# Patient Record
Sex: FEMALE | Race: WHITE | Employment: FULL TIME | URBAN - METROPOLITAN AREA
[De-identification: names, ages, dates, MRNs, and addresses within clinical notes are randomized per-mention and may not be internally consistent; named-entity substitution may affect disease eponyms.]

---

## 2017-05-01 ENCOUNTER — HOSPITAL ENCOUNTER (OUTPATIENT)
Dept: GENERAL RADIOLOGY | Age: 47
Discharge: HOME | End: 2017-05-01
Attending: INTERNAL MEDICINE
Payer: COMMERCIAL

## 2017-05-01 DIAGNOSIS — J40 BRONCHITIS: ICD-10-CM

## 2017-05-01 PROCEDURE — 71020 XR CHEST PA LAT: CPT

## 2019-10-16 ENCOUNTER — HOSPITAL ENCOUNTER (OUTPATIENT)
Dept: CT IMAGING | Age: 49
Discharge: HOME | End: 2019-10-16
Attending: SPECIALIST
Payer: SELF-PAY

## 2019-10-16 DIAGNOSIS — Z00.00 PREVENTATIVE HEALTH CARE: ICD-10-CM

## 2019-10-16 PROCEDURE — 75571 CT HRT W/O DYE W/CA TEST: CPT

## 2019-10-17 NOTE — CARDIO/PULMONARY
Cardiopulmonary Rehab: I reached this patient by phone and shared her coronary calcium CT score of \"20 \" with her. Education given regarding coronary artery disease and its effects on the cardiovascular system were reviewed. Patient states that she does have a family history of coronary artery disease, that she does not have diabetes, and  states she is not a nicotine user. Patient reports she is not currently taking cholesterol medication after having difficulty with side effects. Patient states she does not currently require blood pressure medication. Patient reports being of normal weight, reports making heart healthy diet choices on most days (and states this is a recent development) and is regularly physically active. Patient does want to follow up with a dietitian. Our dietitian will contact her. Patient does feel that stress is a risk factor for her. We discussed the potential daily stress management benefits of physical activity, terell chi, yoga and deep breathing throughout the day. Patient to follow up with primary care physician, Dr. Richa Martinez, and asks that I fax a copy of this report to his office - which I have done. Patient also requests a copy of this report be mailed to her home address which I will do. Understanding verbalized and no further questions at this time.

## 2020-07-09 ENCOUNTER — TELEPHONE (OUTPATIENT)
Dept: CARDIOLOGY CLINIC | Age: 50
End: 2020-07-09

## 2020-07-09 NOTE — TELEPHONE ENCOUNTER
Patient states she spoke with Dr. Fanny Weinstein at [facility] and was told to call and set up a stress test with his nurse. Please advise.      Phone: [phone]

## 2020-07-09 NOTE — TELEPHONE ENCOUNTER
MD Naima Hernandez, RN    Caller: Unspecified (Today,  1:55 PM)               Please set up the patient for a clinic follow up with me in 1-2 weeks.  Will evaluate in the clinic prior to ordering stress test.     [facility]

## 2020-07-10 NOTE — TELEPHONE ENCOUNTER
Returned call to patient. Two patient indentifiers verified. Pt was scheduled for an appointment. Pt verbalized understanding and denies any further questions at this time.

## 2020-07-14 ENCOUNTER — OFFICE VISIT (OUTPATIENT)
Dept: CARDIOLOGY CLINIC | Age: 50
End: 2020-07-14

## 2020-07-14 VITALS
HEIGHT: 69 IN | OXYGEN SATURATION: 97 % | BODY MASS INDEX: 24.59 KG/M2 | SYSTOLIC BLOOD PRESSURE: 116 MMHG | DIASTOLIC BLOOD PRESSURE: 60 MMHG | HEART RATE: 72 BPM | WEIGHT: 166 LBS

## 2020-07-14 DIAGNOSIS — R07.89 CHEST DISCOMFORT: ICD-10-CM

## 2020-07-14 DIAGNOSIS — E03.9 HYPOTHYROIDISM, UNSPECIFIED TYPE: Primary | ICD-10-CM

## 2020-07-14 DIAGNOSIS — E78.2 MIXED HYPERLIPIDEMIA: ICD-10-CM

## 2020-07-14 RX ORDER — LEVOTHYROXINE SODIUM 100 UG/1
TABLET ORAL
COMMUNITY

## 2020-07-14 NOTE — PROGRESS NOTES
[facility] [address]: Helen Hurtado  [phone]          Cardiology Consult/Progress Note      Requesting/referring provider: Dr. Guero Jean  Reason for Consult: Chest fullness/pain    HPI: Betito Isidro, a 44y.o. year-old who presents for evaluation of chest pain in the precordium and left chest. Noticing mostly at night, not during exertion. Associated with pounding palpitation but no rate related palpitations. She is a perimenopausal female who recently started bioequivalent hormonal supplements recently which exacerbation there symptoms. Recent ECG with PCP was mentioned to be unremarkable. Lifestyle controlled as well as hypothyroidism. She has been noticing more fullness  In chest with feeling of dyspnea over past few months which is non exertional. Her PCP performed lipid profile which showed elevated LDL. She is anxious about above symptoms. She denies any pedal edema. No syncope is reported. Sohan zhang is active an bikes about 5 miles 3 times a week. Investigation  Lipids: Jan 2020: , Total Chol 214mg/dl  ECG: Verbally told to be normal in July 2020  TSH Jan 2020: 6  Normal CMP, Jan 2020  HbA1c: 5.3 Jan 2020      Assessment/Plan:  1)Chest pain of uncertain etiology  2)Hypothyroidism  3)Perimenopausal??  4)Hyperlipidemia    Betito Isidro was seen for chest pain symptoms starting recently. Pain is atypical but she has family history of heart disease as well as premature ASCD with stroke in 45s. She also has hyperlipidemia and thus is potentially at risk for premature CAD. I recommend doing a stress test as well as echo for her symptoms. Also suggested initiation of lipid therapy but she will like to try dietary modification first. Will check lipids in 3 months.  Provisional follow up in 1 year          []    High complexity decision making was performed  []    Patient is at high-risk of decompensation with multiple organ involvement  She  has no past medical history on file. Review of system:Patient reports no dyspnea/PND/Orthpnea. She reports no cough/fever/focal neurological deficits/abdominal pain. All other systems negative except as above. No family history on file. Bindu Crenshaw was a smoker with stroke in 45s and  of an MI in 76s    Social history: Non smoker,     PMH: Hypothyroidism, Chest pain    Social History     Socioeconomic History    Marital status:      Spouse name: Not on file    Number of children: Not on file    Years of education: Not on file    Highest education level: Not on file   Tobacco Use    Smoking status: Never Smoker    Smokeless tobacco: Never Used      PE  Vitals:    20 0930   BP: 116/60   Pulse: 72   SpO2: 97%   Weight: 166 lb (75.3 kg)   Height: 5' 9\" (1.753 m)    Body mass index is 24.51 kg/m². General:    Alert, cooperative, no distress. Psychiatric:    Normal Mood and affect    Eye/ENT:      Pupils equal, No asymmetry, Conjunctival pink. Able to hear voice at normal amplitude   Lungs:      Visibly symmetric chest expansion, No palpable tenderness. Clear to auscultation bilaterally. Heart[de-identified]    Regular rate and rhythm, S1, S2 normal, no murmur, click, rub or gallop. No JVD, Normal palpable peripheral pulses. No cyanosis   Abdomen:     Soft, non-tender. Bowel sounds normal. No masses,  No      organomegaly. Extremities:   Extremities normal, atraumatic, no edema. Neurologic:   CN II-XII grossly intact.  No gross focal deficits           Recent Labs:  No results found for: CHOL, CHOLX, CHLST, CHOLV, 818383, HDL, HDLP, LDL, LDLC, DLDLP, TGLX, TRIGL, TRIGP, CHHD, CHHDX  No results found for: YORDAN, CREAPOC, ACREA, CREA, REFC3, REFC4  No results found for: BUN, BUNPOC, IBUN, MBUNV, BUNV  No results found for: K, KI, PLK, WBK  No results found for: HBA1C, HGBE8, BUH4JDCR  No results found for: HGBPOC, HGB, HGBP, HGBEXT  No results found for: PLT, PLTEXT    Reviewed:  No past medical history on file.  Social History     Tobacco Use   Smoking Status Never Smoker   Smokeless Tobacco Never Used     Social History     Substance and Sexual Activity   Alcohol Use None     No Known Allergies  No family history on file. Current Outpatient Medications   Medication Sig    levothyroxine (SYNTHROID) 100 mcg tablet Take  by mouth Daily (before breakfast). No current facility-administered medications for this visit. Ca Morfin MD07/14/20 There are other unrelated non-urgent complaints, but due to the busy schedule and the amount of time I've already spent with her, time does not permit me to address these routine issues at today's visit. I've requested another appointment to review these additional issues. ATTENTION:   This medical record was transcribed using an electronic medical records/speech recognition system. Although proofread, it may and can contain electronic, spelling and other errors. Corrections may be executed at a later time. Please feel free to contact us for any clarifications as needed.     [facility] [place]  [facility], [address] [place] [address] Avenue

## 2020-07-14 NOTE — PATIENT INSTRUCTIONS
You will be scheduled for a Exercise (Treadmill) Test after your appointment today. Please wear comfortable clothing (shorts or pants with a shirt or blouse) and walking/athletic shoes.     Do not eat or drink anything, except water, for at least 2 hours prior to your test.    Do take your scheduled medications prior to your test.

## 2020-07-14 NOTE — LETTER
7/14/20 Patient: Leana Presley YOB: 1970 Date of Visit: 7/14/2020 Janice Damon MD 
[facility]  [facility] [address] [id number] VIA Facsimile: [phone] Dear Janice Damon MD, Thank you for referring Ms. Leana Presley to [facility] for evaluation. My notes for this consultation are attached. If you have questions, please do not hesitate to call me. I look forward to following your patient along with you.  
 
 
Sincerely, 
 
Ryan Gunn MD

## 2020-07-14 NOTE — PROGRESS NOTES
Kate Drake is a 44 y.o. female    Visit Vitals  /60 (BP 1 Location: Left arm, BP Patient Position: Sitting)   Pulse 72   Ht 5' 9\" (1.753 m)   Wt 166 lb (75.3 kg)   SpO2 97%   BMI 24.51 kg/m²       Chief Complaint   Patient presents with    Chest Pain       Chest pain LEFT SIDE MOSTLY AT NIGHT.    SOB NO  Dizziness NO  Swelling NO  Recent hospital visit NO  Refills NO

## 2020-07-16 ENCOUNTER — OFFICE VISIT (OUTPATIENT)
Dept: URGENT CARE | Age: 50
End: 2020-07-16

## 2020-07-16 VITALS — TEMPERATURE: 98.9 F | OXYGEN SATURATION: 97 % | HEART RATE: 85 BPM | RESPIRATION RATE: 16 BRPM

## 2020-07-16 DIAGNOSIS — Z20.822 ENCOUNTER FOR LABORATORY TESTING FOR COVID-19 VIRUS: Primary | ICD-10-CM

## 2020-07-21 LAB — SARS-COV-2, NAA: NOT DETECTED

## 2020-08-07 ENCOUNTER — DOCUMENTATION ONLY (OUTPATIENT)
Dept: CARDIOLOGY CLINIC | Age: 50
End: 2020-08-07

## 2020-08-07 ENCOUNTER — ANCILLARY PROCEDURE (OUTPATIENT)
Dept: CARDIOLOGY CLINIC | Age: 50
End: 2020-08-07
Payer: COMMERCIAL

## 2020-08-07 VITALS
BODY MASS INDEX: 24.59 KG/M2 | HEIGHT: 69 IN | SYSTOLIC BLOOD PRESSURE: 116 MMHG | WEIGHT: 166 LBS | DIASTOLIC BLOOD PRESSURE: 60 MMHG

## 2020-08-07 DIAGNOSIS — R07.89 CHEST DISCOMFORT: ICD-10-CM

## 2020-08-07 DIAGNOSIS — E78.2 MIXED HYPERLIPIDEMIA: ICD-10-CM

## 2020-08-07 LAB
ECHO AO ASC DIAM: 2.64 CM
ECHO AO ROOT DIAM: 3.19 CM
ECHO AV AREA PEAK VELOCITY: 2.67 CM2
ECHO AV AREA VTI: 2.96 CM2
ECHO AV AREA/BSA PEAK VELOCITY: 1.4 CM2/M2
ECHO AV AREA/BSA VTI: 1.6 CM2/M2
ECHO AV MEAN GRADIENT: 2.36 MMHG
ECHO AV PEAK GRADIENT: 4.25 MMHG
ECHO AV PEAK VELOCITY: 103.1 CM/S
ECHO AV VTI: 20.34 CM
ECHO IVC PROX: 1.33 CM
ECHO LA AREA 4C: 15.16 CM2
ECHO LA MAJOR AXIS: 2.89 CM
ECHO LA MINOR AXIS: 1.51 CM
ECHO LA VOL 2C: 37.49 ML (ref 22–52)
ECHO LA VOL 4C: 34.07 ML (ref 22–52)
ECHO LA VOL BP: 41.58 ML (ref 22–52)
ECHO LA VOL/BSA BIPLANE: 21.79 ML/M2 (ref 16–28)
ECHO LA VOLUME INDEX A2C: 19.64 ML/M2 (ref 16–28)
ECHO LA VOLUME INDEX A4C: 17.85 ML/M2 (ref 16–28)
ECHO LV E' LATERAL VELOCITY: 11.24 CM/S
ECHO LV E' SEPTAL VELOCITY: 10.55 CM/S
ECHO LV EDV A2C: 53.99 ML
ECHO LV EDV A4C: 77.29 ML
ECHO LV EDV BP: 66.39 ML (ref 56–104)
ECHO LV EDV INDEX A4C: 40.5 ML/M2
ECHO LV EDV INDEX BP: 34.8 ML/M2
ECHO LV EDV NDEX A2C: 28.3 ML/M2
ECHO LV EJECTION FRACTION A2C: 66 PERCENT
ECHO LV EJECTION FRACTION A4C: 64 PERCENT
ECHO LV EJECTION FRACTION BIPLANE: 65.2 PERCENT (ref 55–100)
ECHO LV ESV A2C: 18.61 ML
ECHO LV ESV A4C: 27.82 ML
ECHO LV ESV BP: 23.12 ML (ref 19–49)
ECHO LV ESV INDEX A2C: 9.8 ML/M2
ECHO LV ESV INDEX A4C: 14.6 ML/M2
ECHO LV ESV INDEX BP: 12.1 ML/M2
ECHO LV INTERNAL DIMENSION DIASTOLIC: 4.11 CM (ref 3.9–5.3)
ECHO LV INTERNAL DIMENSION SYSTOLIC: 2.55 CM
ECHO LV IVSD: 0.91 CM (ref 0.6–0.9)
ECHO LV MASS 2D: 125.3 G (ref 67–162)
ECHO LV MASS INDEX 2D: 65.6 G/M2 (ref 43–95)
ECHO LV POSTERIOR WALL DIASTOLIC: 1.01 CM (ref 0.6–0.9)
ECHO LVOT DIAM: 1.98 CM
ECHO LVOT PEAK GRADIENT: 3.19 MMHG
ECHO LVOT PEAK VELOCITY: 89.25 CM/S
ECHO LVOT SV: 60.2 ML
ECHO LVOT VTI: 19.54 CM
ECHO MV A VELOCITY: 46.79 CM/S
ECHO MV AREA PHT: 4.48 CM2
ECHO MV E DECELERATION TIME (DT): 0.17 S
ECHO MV E VELOCITY: 63.85 CM/S
ECHO MV E/A RATIO: 1.36
ECHO MV E/E' LATERAL: 5.68
ECHO MV E/E' RATIO (AVERAGED): 5.87
ECHO MV E/E' SEPTAL: 6.05
ECHO MV PRESSURE HALF TIME (PHT): 0.05 S
ECHO RA MAJOR AXIS: 2.93 CM
ECHO RV INTERNAL DIMENSION: 2.79 CM
ECHO RV TAPSE: 1.81 CM (ref 1.5–2)
LA VOL DISK BP: 37.63 ML (ref 22–52)
LVOT MG: 1.82 MMHG
STRESS ANGINA INDEX: 0
STRESS BASELINE DIAS BP: 60 MMHG
STRESS BASELINE HR: 71 BPM
STRESS BASELINE SYS BP: 100 MMHG
STRESS ESTIMATED WORKLOAD: 10.1 METS
STRESS EXERCISE DUR MIN: NORMAL MIN:SEC
STRESS O2 SAT PEAK: 98 %
STRESS PEAK DIAS BP: 70 MMHG
STRESS PEAK SYS BP: 120 MMHG
STRESS PERCENT HR ACHIEVED: 91 %
STRESS POST PEAK HR: 153 BPM
STRESS RATE PRESSURE PRODUCT: NORMAL BPM*MMHG
STRESS SR DUKE TREADMILL SCORE: -2
STRESS ST DEPRESSION: 2 MM
STRESS TARGET HR: 168 BPM

## 2020-08-07 PROCEDURE — 93015 CV STRESS TEST SUPVJ I&R: CPT | Performed by: SPECIALIST

## 2020-08-07 PROCEDURE — 93306 TTE W/DOPPLER COMPLETE: CPT | Performed by: SPECIALIST

## 2020-08-07 NOTE — PROGRESS NOTES
Stress test had ekg changes which make it positive. Likely false positive, however she should have stress echo to make sure nothing is going on.

## 2020-08-10 ENCOUNTER — TELEPHONE (OUTPATIENT)
Dept: CARDIOLOGY CLINIC | Age: 50
End: 2020-08-10

## 2020-08-10 NOTE — TELEPHONE ENCOUNTER
Antonio Shoemaker MD 3 days ago          Stress test had ekg changes which make it positive. Likely false positive, however she should have stress echo to make sure nothing is going on.

## 2020-08-13 ENCOUNTER — TELEPHONE (OUTPATIENT)
Dept: CARDIOLOGY CLINIC | Age: 50
End: 2020-08-13

## 2020-08-13 DIAGNOSIS — R94.31 ABNORMAL EKG: Primary | ICD-10-CM

## 2020-08-13 NOTE — TELEPHONE ENCOUNTER
----- Message from Rebekah Bryson MD sent at 8/12/2020 11:25 AM EDT -----  Call patient to inform that the echo is normal but stress test is not completely normal. Would be good to set up a follow up appointment with possibly echo stress test/nuclear stress test as there is a chance that the ecg stress is false positive. Alternatively I can see her first to discuss the optios.   ----- Message -----  From: Jose Luis Wolfe MD  Sent: 8/7/2020   5:53 PM EDT  To: Rebekah Bryson MD

## 2020-08-13 NOTE — TELEPHONE ENCOUNTER
Called patient. Two patient indentifiers verified. Pt was scheduled for a nuclear stress test and an appointment with Chintan Hurtado to discuss the stress test results. Pt verbalized understanding and denies any further questions at this time.      Future Appointments   Date Time Provider Flo Gallo   8/21/2020  1:40 PM MD YAMILET Villasenor BS AMB   8/31/2020  1:00 PM JOSÉ HOLT AMB   7/13/2021  3:00 PM MD YAMILET Villasenor BS AMB

## 2020-08-14 DIAGNOSIS — R94.31 ABNORMAL EKG: ICD-10-CM

## 2020-08-21 ENCOUNTER — OFFICE VISIT (OUTPATIENT)
Dept: CARDIOLOGY CLINIC | Age: 50
End: 2020-08-21
Payer: COMMERCIAL

## 2020-08-21 VITALS
BODY MASS INDEX: 24.44 KG/M2 | HEART RATE: 79 BPM | HEIGHT: 69 IN | DIASTOLIC BLOOD PRESSURE: 60 MMHG | RESPIRATION RATE: 18 BRPM | WEIGHT: 165 LBS | SYSTOLIC BLOOD PRESSURE: 120 MMHG | OXYGEN SATURATION: 98 %

## 2020-08-21 DIAGNOSIS — R94.31 ABNORMAL ECG DURING EXERCISE STRESS TEST: ICD-10-CM

## 2020-08-21 DIAGNOSIS — R07.9 CHEST PAIN, UNSPECIFIED TYPE: Primary | ICD-10-CM

## 2020-08-21 DIAGNOSIS — E03.9 ACQUIRED HYPOTHYROIDISM: ICD-10-CM

## 2020-08-21 DIAGNOSIS — F41.9 ANXIETY: ICD-10-CM

## 2020-08-21 DIAGNOSIS — N95.1 FLUSHING, MENOPAUSAL: ICD-10-CM

## 2020-08-21 PROCEDURE — 99213 OFFICE O/P EST LOW 20 MIN: CPT | Performed by: INTERNAL MEDICINE

## 2020-08-22 PROBLEM — E03.9 ACQUIRED HYPOTHYROIDISM: Status: ACTIVE | Noted: 2020-08-22

## 2020-08-22 PROBLEM — R94.31 ABNORMAL ECG DURING EXERCISE STRESS TEST: Status: ACTIVE | Noted: 2020-08-22

## 2020-08-22 PROBLEM — F41.9 ANXIETY: Status: ACTIVE | Noted: 2020-08-22

## 2020-08-22 PROBLEM — R07.9 CHEST PAIN: Status: ACTIVE | Noted: 2020-08-22

## 2020-08-22 PROBLEM — N95.1 FLUSHING, MENOPAUSAL: Status: ACTIVE | Noted: 2020-08-22

## 2020-08-22 NOTE — PROGRESS NOTES
[facility] [address]: Neena Carter  [phone]          Cardiology Consult/Progress Note      Requesting/referring provider: Dr. Lane Mcgowan  Reason for Consult: Chest fullness/pain    HPI: Jo Gomez, a 44y.o. year-old who presents for evaluation of chest pain in the precordium and left chest. Noticing mostly at night, not during exertion. Associated with pounding palpitation but no rate related palpitations. She is a perimenopausal female who recently started progesterone only hormonal supplements recently which exacerbation there symptoms. Recent ECG with PCP was mentioned to be unremarkable. She also has history of hypothyroidism. She has been noticing more fullness  in chest with feeling of dyspnea over past few months which is non exertional. Her PCP performed lipid profile which showed elevated LDL. She is anxious about above symptoms. She denies any pedal edema. No syncope is reported. She otherwise is active an bikes about 5 miles 3 times a week. Recently she underwent echocardiogram at my recommendations which showed normal LV function with no other structural abnormality. She also underwent an ECG stress test which however was not completely normal.    Investigation  Lipids: Jan 2020: , Total Chol 214mg/dl  ECG: Verbally told to be normal in July 2020  TSH Jan 2020: 6  Normal CMP, Jan 2020  HbA1c: 5.3 Jan 2020    Investigations personally reviewed by me  Echocardiogram August 2020: Normal LV function with strain, mild aortic sclerosis otherwise normal echocardiogram  ECG stress test August 2020: Baseline ECG normal.  Patient went to stage III on a Juan C protocol. At stage III she had about 1.5 to 2 mm of ST depressions in inferior leads. She had no associated chest discomfort or shortness of breath. However she was wearing a mask during exercise and feels that she could have gone further if she was not wearing a mask. Geovanni Chen   Hence it was not truly symptom limited stress test.      Assessment/Plan:  1)Chest pain of uncertain etiology  2)Hypothyroidism  3)Perimenopausal??  4)Hyperlipidemia    John Albert was seen for chest pain symptoms starting recently. Pain is atypical but she has family history of heart disease as well as premature ASCD with stroke in 45s. She also has hyperlipidemia and thus is potentially at risk for premature CAD. Her recent echocardiogram was normal.  However her recent stress test ECG changes were seen in the form of intermediate ST depressions. In the setting of such depressions underlying coronary disease cannot be ruled out. However specificity of isolated ST depressions of less than 2 mm in inferior leads in the movement is poor. Hence I offered performing either a imaging with stress test or alternatively a cardiac catheterization to the patient for further confirmation/ruling out underlying coronary disease. She feels more comfortable proceeding with an imaging stress test which is reasonable. Given that her clinical symptoms are atypical and during exercise she did not have such symptoms, I am uncertain regarding what to make out of those EKG changes. Nevertheless we should consider initiation of lipid therapy but she will like to try dietary modification first. Will check lipids in 3 months and follow her up after the imaging stress test.    []    High complexity decision making was performed  []    Patient is at high-risk of decompensation with multiple organ involvement  She  has no past medical history on file. Review of system:Patient reports no dyspnea/PND/Orthpnea. She reports no cough/fever/focal neurological deficits/abdominal pain. All other systems negative except as above. No family history on file.  Melchor Garrison was a smoker with stroke in 45s and  of an MI in 76s    Social history: Non smoker,     PMH: Hypothyroidism, Chest pain    Social History     Socioeconomic History    Marital status:  Spouse name: Not on file    Number of children: Not on file    Years of education: Not on file    Highest education level: Not on file   Tobacco Use    Smoking status: Never Smoker    Smokeless tobacco: Never Used      PE  Vitals:    08/21/20 1400   BP: 120/60   Pulse: 79   Resp: 18   SpO2: 98%   Weight: 165 lb (74.8 kg)   Height: 5' 9\" (1.753 m)    Body mass index is 24.37 kg/m². General:    Alert, cooperative, no distress. Psychiatric:    Normal Mood and affect    Eye/ENT:      Pupils equal, No asymmetry, Conjunctival pink. Able to hear voice at normal amplitude   Lungs:      Visibly symmetric chest expansion, No palpable tenderness. Clear to auscultation bilaterally. Heart[de-identified]    Regular rate and rhythm, S1, S2 normal, no murmur, click, rub or gallop. No JVD, Normal palpable peripheral pulses. No cyanosis   Abdomen:     Soft, non-tender. Bowel sounds normal. No masses,  No      organomegaly. Extremities:   Extremities normal, atraumatic, no edema. Neurologic:   CN II-XII grossly intact. No gross focal deficits           Recent Labs:  No results found for: CHOL, CHOLX, CHLST, CHOLV, 807424, HDL, HDLP, LDL, LDLC, DLDLP, TGLX, TRIGL, TRIGP, CHHD, CHHDX  No results found for: YORDAN, CREAPOC, ACREA, CREA, REFC3, REFC4  No results found for: BUN, BUNPOC, IBUN, MBUNV, BUNV  No results found for: K, KI, PLK, WBK  No results found for: HBA1C, HGBE8, POT9JNEA, HBK0WMNQ  No results found for: HGBPOC, HGB, HGBP, HGBEXT, HGBEXT  No results found for: PLT, PLTEXT, PLTEXT    Reviewed:  No past medical history on file. Social History     Tobacco Use   Smoking Status Never Smoker   Smokeless Tobacco Never Used     Social History     Substance and Sexual Activity   Alcohol Use None     No Known Allergies  No family history on file. Current Outpatient Medications   Medication Sig    levothyroxine (SYNTHROID) 100 mcg tablet Take  by mouth Daily (before breakfast).      No current facility-administered medications for this visit. Ariana Horton MD08/22/20 There are other unrelated non-urgent complaints, but due to the busy schedule and the amount of time I've already spent with her, time does not permit me to address these routine issues at today's visit. I've requested another appointment to review these additional issues. ATTENTION:   This medical record was transcribed using an electronic medical records/speech recognition system. Although proofread, it may and can contain electronic, spelling and other errors. Corrections may be executed at a later time. Please feel free to contact us for any clarifications as needed.     [facility] [place]  [address], [address], [address] [place] [address] Avenue

## 2020-08-28 ENCOUNTER — HOSPITAL ENCOUNTER (OUTPATIENT)
Dept: PREADMISSION TESTING | Age: 50
Discharge: HOME | End: 2020-08-28
Payer: COMMERCIAL

## 2020-08-28 DIAGNOSIS — Z01.812 PRE-PROCEDURE LAB EXAM: ICD-10-CM

## 2020-08-28 PROCEDURE — 87635 SARS-COV-2 COVID-19 AMP PRB: CPT

## 2020-08-29 LAB — SARS-COV-2, COV2NT: NOT DETECTED

## 2020-08-31 ENCOUNTER — ANCILLARY PROCEDURE (OUTPATIENT)
Dept: CARDIOLOGY CLINIC | Age: 50
End: 2020-08-31
Payer: COMMERCIAL

## 2020-08-31 VITALS — HEIGHT: 69 IN | BODY MASS INDEX: 24.59 KG/M2 | WEIGHT: 166 LBS

## 2020-08-31 DIAGNOSIS — R07.9 CHEST PAIN, UNSPECIFIED TYPE: ICD-10-CM

## 2020-08-31 DIAGNOSIS — R94.31 ABNORMAL EKG: ICD-10-CM

## 2020-08-31 DIAGNOSIS — R94.31 ABNORMAL ECG DURING EXERCISE STRESS TEST: ICD-10-CM

## 2020-08-31 PROCEDURE — A9500 TC99M SESTAMIBI: HCPCS

## 2020-08-31 PROCEDURE — 93015 CV STRESS TEST SUPVJ I&R: CPT | Performed by: INTERNAL MEDICINE

## 2020-08-31 PROCEDURE — 78452 HT MUSCLE IMAGE SPECT MULT: CPT | Performed by: INTERNAL MEDICINE

## 2020-08-31 RX ORDER — TETRAKIS(2-METHOXYISOBUTYLISOCYANIDE)COPPER(I) TETRAFLUOROBORATE 1 MG/ML
10 INJECTION, POWDER, LYOPHILIZED, FOR SOLUTION INTRAVENOUS ONCE
Status: COMPLETED | OUTPATIENT
Start: 2020-08-31 | End: 2020-08-31

## 2020-08-31 RX ORDER — TETRAKIS(2-METHOXYISOBUTYLISOCYANIDE)COPPER(I) TETRAFLUOROBORATE 1 MG/ML
30 INJECTION, POWDER, LYOPHILIZED, FOR SOLUTION INTRAVENOUS ONCE
Status: COMPLETED | OUTPATIENT
Start: 2020-08-31 | End: 2020-08-31

## 2020-08-31 RX ADMIN — TETRAKIS(2-METHOXYISOBUTYLISOCYANIDE)COPPER(I) TETRAFLUOROBORATE 25.1 MILLICURIE: 1 INJECTION, POWDER, LYOPHILIZED, FOR SOLUTION INTRAVENOUS at 14:35

## 2020-08-31 RX ADMIN — TETRAKIS(2-METHOXYISOBUTYLISOCYANIDE)COPPER(I) TETRAFLUOROBORATE 8.3 MILLICURIE: 1 INJECTION, POWDER, LYOPHILIZED, FOR SOLUTION INTRAVENOUS at 13:15

## 2020-09-01 LAB
STRESS ANGINA INDEX: 0
STRESS BASELINE DIAS BP: 82 MMHG
STRESS BASELINE HR: 81 BPM
STRESS BASELINE SYS BP: 122 MMHG
STRESS ESTIMATED WORKLOAD: 11.7 METS
STRESS EXERCISE DUR MIN: NORMAL MIN:SEC
STRESS O2 SAT PEAK: 99 %
STRESS O2 SAT REST: 100 %
STRESS PEAK DIAS BP: 76 MMHG
STRESS PEAK SYS BP: 150 MMHG
STRESS PERCENT HR ACHIEVED: 102 %
STRESS POST PEAK HR: 171 BPM
STRESS RATE PRESSURE PRODUCT: NORMAL BPM*MMHG
STRESS SR DUKE TREADMILL SCORE: -1
STRESS ST DEPRESSION: 2 MM
STRESS TARGET HR: 168 BPM

## 2021-08-18 ENCOUNTER — OFFICE VISIT (OUTPATIENT)
Dept: CARDIOLOGY CLINIC | Age: 51
End: 2021-08-18
Payer: COMMERCIAL

## 2021-08-18 VITALS
HEART RATE: 74 BPM | SYSTOLIC BLOOD PRESSURE: 122 MMHG | WEIGHT: 172 LBS | OXYGEN SATURATION: 99 % | RESPIRATION RATE: 16 BRPM | DIASTOLIC BLOOD PRESSURE: 80 MMHG | BODY MASS INDEX: 25.48 KG/M2 | HEIGHT: 69 IN

## 2021-08-18 DIAGNOSIS — R07.9 CHEST PAIN, UNSPECIFIED TYPE: Primary | ICD-10-CM

## 2021-08-18 PROCEDURE — 99214 OFFICE O/P EST MOD 30 MIN: CPT | Performed by: INTERNAL MEDICINE

## 2021-08-18 PROCEDURE — 93000 ELECTROCARDIOGRAM COMPLETE: CPT | Performed by: INTERNAL MEDICINE

## 2021-08-18 NOTE — PROGRESS NOTES
[facility] [address]: Yoli Brito  [phone]          Cardiology Consult/Progress Note      Requesting/referring provider: Dr. Montrell Fonseca  Reason for Consult: Chest fullness/pain    HPI: Tim Kemp, a 46y.o. year-old who presents for evaluation of chest pain in the precordium and left chest. Noticing mostly at night, not during exertion. Associated with pounding palpitation but no rate related palpitations. She is a perimenopausal female who recently started progesterone only hormonal supplements recently which exacerbation there symptoms. Recent ECG with PCP was mentioned to be unremarkable. She also has history of hypothyroidism. She has been noticing more fullness  in chest with feeling of dyspnea over past few months which is non exertional. Her PCP performed lipid profile which showed elevated LDL. She is anxious about above symptoms. She denies any pedal edema. No syncope is reported. She otherwise is active an bikes about 5 miles 3 times a week. Recently she underwent echocardiogram at my recommendations which showed normal LV function with no other structural abnormality. She also underwent an ECG stress test which however was not completely normal.    Investigation  Lipids: Jan 2020: , Total Chol 214mg/dl  ECG: Verbally told to be normal in July 2020  TSH Jan 2020: 6  Normal CMP, Jan 2020  HbA1c: 5.3 Jan 2020    Investigations personally reviewed by me  Echocardiogram August 2020: Normal LV function with strain, mild aortic sclerosis otherwise normal echocardiogram  ECG stress test August 2020: Baseline ECG normal.  Patient went to stage III on a Juan C protocol. At stage III she had about 1.5 to 2 mm of ST depressions in inferior leads. She had no associated chest discomfort or shortness of breath. However she was wearing a mask during exercise and feels that she could have gone further if she was not wearing a mask. Kristopher Stanton   Hence it was not truly symptom limited stress test.  Nuclear SPECT: possible apical perfusion defect possible artifact. Normal EF. NO other perfusion defect. Assessment/Plan:  1)Chest pain of uncertain etiology  2)Hypothyroidism  3)Perimenopausal  4)Hyperlipidemia  5)Headaches:     Dorian Jama was seen for chest pain symptoms starting recently. Pain is atypical but she has family history of heart disease as well as premature ASCD with stroke in 45s. She also has hyperlipidemia and thus is potentially at risk for premature CAD. Her echocardiogram was normal.  However her stress test ECG changes were seen in the form of intermediate ST depressions. In the setting of such depressions underlying coronary disease cannot be ruled out. Hence she underwent a imaging stress test which shows possible artifact in apical segment with normal wall function. NO other perfusion defects were noted. Over past 1 year no further chest pain is noted. She is now engaged in biking and swimming with no exertional angina. ECG changes have resolves. Will recommend Lipid check and start Statin if LDL is > 100 mg/dl. She is unwilling to start empiric statins at this time    []    High complexity decision making was performed  []    Patient is at high-risk of decompensation with multiple organ involvement  She  has no past medical history on file. Review of system:Patient reports no dyspnea/PND/Orthpnea. She reports no cough/fever/focal neurological deficits/abdominal pain. All other systems negative except as above. No family history on file.  Father was a smoker with stroke in 45s and  of an MI in 76s    Social history: Non smoker,     PMH: Hypothyroidism, Chest pain      Social History     Socioeconomic History    Marital status:      Spouse name: Not on file    Number of children: Not on file    Years of education: Not on file    Highest education level: Not on file   Tobacco Use    Smoking status: Never Smoker    Smokeless tobacco: Never Used   Substance and Sexual Activity    Alcohol use: Yes     Alcohol/week: 4.0 standard drinks     Types: 4 Standard drinks or equivalent per week     Comment: 4 a week    Drug use: Never     Social Determinants of Health     Financial Resource Strain:     Difficulty of Paying Living Expenses:    Food Insecurity:     Worried About Running Out of Food in the Last Year:     [address] in the Last Year:    Transportation Needs:     Lack of Transportation (Medical):  Lack of Transportation (Non-Medical):    Physical Activity:     Days of Exercise per Week:     Minutes of Exercise per Session:    Stress:     Feeling of Stress :    Social Connections:     Frequency of Communication with Friends and Family:     Frequency of Social Gatherings with Friends and Family:     Attends [employer] Services:     Active Member of Clubs or Organizations:     Attends Club or Organization Meetings:     Marital Status:       PE  Vitals:    08/18/21 1437   BP: 122/80   Pulse: 74   Resp: 16   SpO2: 99%   Weight: 172 lb (78 kg)   Height: 5' 9\" (1.753 m)    Body mass index is 25.4 kg/m². General:    Alert, cooperative, no distress. Psychiatric:    Normal Mood and affect    Eye/ENT:      Pupils equal, No asymmetry, Conjunctival pink. Able to hear voice at normal amplitude   Lungs:      Visibly symmetric chest expansion, No palpable tenderness. Clear to auscultation bilaterally. Heart[de-identified]    Regular rate and rhythm, S1, S2 normal, no murmur, click, rub or gallop. No JVD, Normal palpable peripheral pulses. No cyanosis   Abdomen:     Soft, non-tender. Bowel sounds normal. No masses,  No      organomegaly. Extremities:   Extremities normal, atraumatic, no edema. Neurologic:   CN II-XII grossly intact.  No gross focal deficits           Recent Labs:  No results found for: CHOL, CHOLX, CHLST, CHOLV, 200035, HDL, HDLP, LDL, LDLC, DLDLP, TGLX, TRIGL, TRIGP, CHHD, CHHDX  No results found for: YORDAN, Wilbert Erm, CREA, REFC3, REFC4  No results found for: BUN, BUNPOC, IBUN, MBUNV, BUNV  No results found for: K, KI, PLK, WBK  No results found for: HBA1C, EKP3QYRP, QZP4AOYC  No results found for: HGBPOC, HGB, HGBP, HGBEXT, HGBEXT  No results found for: PLT, PLTEXT, PLTEXT    Reviewed:  No past medical history on file. Social History     Tobacco Use   Smoking Status Never Smoker   Smokeless Tobacco Never Used     Social History     Substance and Sexual Activity   Alcohol Use Yes    Alcohol/week: 4.0 standard drinks    Types: 4 Standard drinks or equivalent per week    Comment: 4 a week     No Known Allergies  No family history on file. Current Outpatient Medications   Medication Sig    levothyroxine (SYNTHROID) 100 mcg tablet Take  by mouth Daily (before breakfast). No current facility-administered medications for this visit. Laila Martinez MD08/18/21     ATTENTION:   This medical record was transcribed using an electronic medical records/speech recognition system. Although proofread, it may and can contain electronic, spelling and other errors. Corrections may be executed at a later time. Please feel free to contact us for any clarifications as needed.     [facility] [place]  [address], [address], [address] [place] [address]

## 2021-08-18 NOTE — LETTER
8/18/2021    Patient: Pepito Stevens   YOB: 1970   Date of Visit: 8/18/2021     Dusty Zelaya MD  [place] [address]  Via Fax: [phone]    Dear Dusty Zelaya MD,      Thank you for referring Ms. Pepito Stevens to [facility] for evaluation. My notes for this consultation are attached. If you have questions, please do not hesitate to call me. I look forward to following your patient along with you.       Sincerely,    Jose C Gonzales MD

## 2022-03-18 PROBLEM — F41.9 ANXIETY: Status: ACTIVE | Noted: 2020-08-22

## 2022-03-19 PROBLEM — E03.9 ACQUIRED HYPOTHYROIDISM: Status: ACTIVE | Noted: 2020-08-22

## 2022-03-19 PROBLEM — R94.31 ABNORMAL ECG DURING EXERCISE STRESS TEST: Status: ACTIVE | Noted: 2020-08-22

## 2022-03-19 PROBLEM — N95.1 FLUSHING, MENOPAUSAL: Status: ACTIVE | Noted: 2020-08-22

## 2022-03-20 PROBLEM — R07.9 CHEST PAIN: Status: ACTIVE | Noted: 2020-08-22

## 2022-05-04 ENCOUNTER — OFFICE VISIT (OUTPATIENT)
Dept: ORTHOPEDIC SURGERY | Age: 52
End: 2022-05-04
Payer: COMMERCIAL

## 2022-05-04 VITALS — BODY MASS INDEX: 25.18 KG/M2 | HEIGHT: 69 IN | WEIGHT: 170 LBS

## 2022-05-04 DIAGNOSIS — M79.671 RIGHT FOOT PAIN: ICD-10-CM

## 2022-05-04 DIAGNOSIS — M21.41: ICD-10-CM

## 2022-05-04 DIAGNOSIS — M20.11 HALLUX VALGUS (ACQUIRED), RIGHT FOOT: Primary | ICD-10-CM

## 2022-05-04 PROCEDURE — 99203 OFFICE O/P NEW LOW 30 MIN: CPT | Performed by: ORTHOPAEDIC SURGERY

## 2022-05-04 NOTE — PATIENT INSTRUCTIONS
Ankle: Exercises  Introduction  Here are some examples of exercises for you to try. The exercises may be suggested for a condition or for rehabilitation. Start each exercise slowly. Ease off the exercises if you start to have pain. You will be told when to start these exercises and which ones will work best for you. How to do the exercises  'Alphabet' exercise    1. Trace the alphabet with your toe. This helps your ankle move in all directions. Side-to-side knee swing exercise    1. Sit in a chair with your foot flat on the floor. 2. Slowly move your knee from side to side while keeping your foot pressed flat. 3. Continue this exercise for 2 to 3 minutes. Towel curl    1. While sitting, place your foot on a towel on the floor and scrunch the towel toward you with your toes. 2. Then use your toes to push the towel away from you. 3. Make this exercise more challenging by placing a weighted object, such as a soup can, on the other end of the towel. Towel stretch    1. Sit with your legs extended and knees straight. 2. Place a towel around your foot just under the toes. 3. Hold each end of the towel in each hand, with your hands above your knees. 4. Pull back with the towel so that your foot stretches toward you. 5. Hold the position for at least 15 to 30 seconds. 6. Repeat 2 to 4 times a session, up to 5 sessions a day. Ankle eversion exercise    1. Start by sitting with your foot flat on the floor and pushing it outward against an immovable object such as the wall or heavy furniture. Hold for about 6 seconds, then relax. Repeat 8 to 12 times. 2. After you feel comfortable with this, try using rubber tubing looped around the outside of your feet for resistance. Push your foot out to the side against the tubing, and then count to 10 as you slowly bring your foot back to the middle. Repeat 8 to 12 times. Isometric opposition exercises    1.  While sitting, put your feet together flat on the floor.  2. Press your injured foot inward against your other foot. Hold for about 6 seconds, and relax. Repeat 8 to 12 times. 3. Then place the heel of your other foot on top of the injured one. Push down with the top heel while trying to push up with your injured foot. Hold for about 6 seconds, and relax. Repeat 8 to 12 times. Follow-up care is a key part of your treatment and safety. Be sure to make and go to all appointments, and call your doctor if you are having problems. It's also a good idea to know your test results and keep a list of the medicines you take. Where can you learn more? Go to http://www.gray.com/  Enter R730 in the search box to learn more about \"Ankle: Exercises. \"  Current as of: July 1, 2021               Content Version: 13.2  © 0726-2004 [employer]. Care instructions adapted under license by [employer] (which disclaims liability or warranty for this information). If you have questions about a medical condition or this instruction, always ask your healthcare professional. [place] [address] any warranty or liability for your use of this information. Ankle: Exercises  Introduction  Here are some examples of exercises for you to try. The exercises may be suggested for a condition or for rehabilitation. Start each exercise slowly. Ease off the exercises if you start to have pain. You will be told when to start these exercises and which ones will work best for you. How to do the exercises  'Alphabet' exercise    2. Trace the alphabet with your toe. This helps your ankle move in all directions. Side-to-side knee swing exercise    4. Sit in a chair with your foot flat on the floor. 5. Slowly move your knee from side to side while keeping your foot pressed flat. 6. Continue this exercise for 2 to 3 minutes. Towel curl    4.  While sitting, place your foot on a towel on the floor and scrunch the towel toward you with your toes. 5. Then use your toes to push the towel away from you. 6. Make this exercise more challenging by placing a weighted object, such as a soup can, on the other end of the towel. Towel stretch    7. Sit with your legs extended and knees straight. 8. Place a towel around your foot just under the toes. 9. Hold each end of the towel in each hand, with your hands above your knees. 10. Pull back with the towel so that your foot stretches toward you. 11. Hold the position for at least 15 to 30 seconds. 12. Repeat 2 to 4 times a session, up to 5 sessions a day. Ankle eversion exercise    3. Start by sitting with your foot flat on the floor and pushing it outward against an immovable object such as the wall or heavy furniture. Hold for about 6 seconds, then relax. Repeat 8 to 12 times. 4. After you feel comfortable with this, try using rubber tubing looped around the outside of your feet for resistance. Push your foot out to the side against the tubing, and then count to 10 as you slowly bring your foot back to the middle. Repeat 8 to 12 times. Isometric opposition exercises    4. While sitting, put your feet together flat on the floor. 5. Press your injured foot inward against your other foot. Hold for about 6 seconds, and relax. Repeat 8 to 12 times. 6. Then place the heel of your other foot on top of the injured one. Push down with the top heel while trying to push up with your injured foot. Hold for about 6 seconds, and relax. Repeat 8 to 12 times. Follow-up care is a key part of your treatment and safety. Be sure to make and go to all appointments, and call your doctor if you are having problems. It's also a good idea to know your test results and keep a list of the medicines you take. Where can you learn more? Go to http://www.gray.com/  Enter R730 in the search box to learn more about \"Ankle: Exercises. \"  Current as of: July 1, 2021               Content Version: 13.2  © 2006-2022 [employer]. Care instructions adapted under license by [employer] (which disclaims liability or warranty for this information). If you have questions about a medical condition or this instruction, always ask your healthcare professional. [address] any warranty or liability for your use of this information. Foot Arthritis: Exercises  Introduction  Here are some examples of exercises for you to try. The exercises may be suggested for a condition or for rehabilitation. Start each exercise slowly. Ease off the exercises if you start to have pain. You will be told when to start these exercises and which ones will work best for you. How to do the exercises  Calf stretch (knees straight)    3. Place a book on the floor a few inches from a wall or countertop, and put the balls of your feet on it. Your heels should be on the floor. The book needs to be thick enough so that you can feel a gentle stretch in your calf. If you are not steady on your feet, hold on to a chair, counter, or wall while you do this stretch. 4. Keep your knees straight, and lean forward until you feel a stretch in your calf. 5. To get more stretch, add another book or use a thicker book, such as a phone book, a dictionary, or an encyclopedia. 6. Hold the stretch for at least 15 to 30 seconds. 7. Repeat 2 to 4 times. Calf stretch (knees bent)    7. Place a book on the floor a few inches from a wall or countertop, and put the balls of your feet on it. Your heels should be on the floor. The book needs to be thick enough so that you can feel a gentle stretch in your calf. If you are not steady on your feet, hold on to a chair, counter, or wall while you do this stretch. 8. Bend your knees, and lean forward until you feel a stretch in your calf.   9. To get more stretch, add another book or use a thicker book, such as a phone book, a dictionary, or an encyclopedia. 10. Hold the stretch for at least 15 to 30 seconds. 11. Repeat 2 to 4 times. Great toe extension stretch    7. Sit in a chair, and put your affected foot across your other knee. 8. Grasp your heel with one hand and then slowly pull your big toe back with your other hand. Pull your toe back toward your ankle until you feel a stretch along the bottom of your foot. 9. Hold the stretch for at least 15 to 30 seconds. 10. Repeat 2 to 4 times. 11. Switch feet and repeat steps 1 through 4, even if only one foot is sore. Great toe flexion stretch    13. Sit in a chair, and put your affected foot across your other knee. 14. Grasp your heel with one hand and then slowly push your big toe down with your other hand. Push your toe down and away from your ankle until you feel a stretch along the top of your foot. 15. Hold the stretch for at least 15 to 30 seconds. 16. Repeat 2 to 4 times. 17. Switch feet and repeat steps 1 through 4, even if only one foot is sore. Ankle alphabet    5. Sit in a chair with your feet flat on the floor. (You can also do this exercise lying on your back with your affected leg propped up on a pillow). 6. Lift the heel of your sore foot off the floor, and slowly trace the letters of the alphabet. 7. Switch feet and repeat steps 1 through 2, even if only one foot is sore. Resisted ankle dorsiflexion    7. Tie the ends of an exercise band together to form a loop. Attach one end of the loop to a secure object or shut a door on it to hold it in place. (Or you can have someone hold one end of the loop to provide resistance.)  8. While sitting on the floor or in a chair, loop the other end of the band over the top of your affected foot. 9. Keeping your knee and leg straight, slowly flex your foot to pull back on the exercise band, and then slowly relax. 10. Repeat 8 to 12 times. 11. Switch feet and repeat steps 1 through 4, even if only one foot is sore. Towel curl    1.  While sitting, place your affected foot on a towel on the floor, and scrunch the towel toward you with your toes. 2. Then use your toes to push the towel away from you. 3. Repeat 8 to 12 times. 4. Switch feet and repeat steps 1 through 3, even if only one foot is sore. Resisted ankle eversion    1. Sit on the floor with your legs straight. 2. Hold both ends of an exercise band and loop the band around the outside of your affected foot. Then press your other foot against the band. 3. Keeping your leg straight, slowly push your affected foot outward against the band and away from your other foot without letting your leg rotate. Then slowly relax. 4. Repeat 8 to 12 times. 5. Switch feet and repeat steps 1 through 4, even if only one foot is sore. Resisted ankle inversion    1. Sit on the floor with your good leg crossed over your other leg. 2. Hold both ends of an exercise band and loop the band around the inside of your affected foot. Then press your other foot against the band. 3. Keeping your legs crossed, slowly push your affected foot against the band so that foot moves away from your other foot. Then slowly relax. 4. Repeat 8 to 12 times. 5. Switch feet and repeat steps 1 through 4, even if only one foot is sore. Follow-up care is a key part of your treatment and safety. Be sure to make and go to all appointments, and call your doctor if you are having problems. It's also a good idea to know your test results and keep a list of the medicines you take. Where can you learn more? Go to http://www.gray.com/  Enter K113 in the search box to learn more about \"Foot Arthritis: Exercises. \"  Current as of: July 1, 2021               Content Version: 13.2  © 8711-4755 [employer], [employer]. Care instructions adapted under license by "[employer] (which disclaims liability or warranty for this information).  If you have questions about a medical condition or this instruction, always ask your healthcare professional. [place] [address] any warranty or liability for your use of this information.

## 2022-05-04 NOTE — PROGRESS NOTES
Loraine Gleason (: 1970) is a 45 y.o. female, patient,here for evaluation of the following   Chief Complaint   Patient presents with    Foot Pain     has a bunion that causes her to have pain at the top of her foot when it is time for her to walk         ASSESSMENT/PLAN:  Below is the assessment and plan developed based on review of pertinent history, physical exam, labs, studies, and medications. 1. Hallux valgus (acquired), right foot  -     AMB SUPPLY ORDER  2. Hypermobile flat foot of right lower extremity  -     AMB SUPPLY ORDER  3. Right foot pain  -     XR STANDING FOOT RT MIN 3 V; Future  -     AMB SUPPLY ORDER    Patient informed of findings, she does have hypermobile feet which is prone for the deformity she has acquired which includes the flatfeet and acquired hallux valgus. We discussed the treatment to start with shoewear modification, provided information on where to purchase the tight right type of shoe for activity level and also use of an arch support. If all this fails, she could be candidate for hallux valgus procedure however it may also include having to address the hindfoot deformity. We briefly discussed the procedure which could include hindfoot osteotomy to correct the valgus, arthrodesis if there is significant arthritis, tendon transfers if needed and probably a Lapidus which would correct both the hallux valgus position and also stabilize the midfoot to address the loss of arch. However in the meantime she has not tried any conservative treatments I recommend maximizing conservative treatment first prior to surgery. Currently no surgical indications but if all fails, she could be a candidate for foot reconstruction which may include both the hindfoot and the forefoot deformities. Other studies may be necessary if surgery is elected in the future which includes either an MRI, CT scan or both      Return if symptoms worsen or fail to improve.       No Known Allergies    Current Outpatient Medications   Medication Sig    levothyroxine (SYNTHROID) 100 mcg tablet Take  by mouth Daily (before breakfast). No current facility-administered medications for this visit. No past medical history on file. No past surgical history on file. No family history on file. Social History     Socioeconomic History    Marital status:      Spouse name: Not on file    Number of children: Not on file    Years of education: Not on file    Highest education level: Not on file   Occupational History    Not on file   Tobacco Use    Smoking status: Never Smoker    Smokeless tobacco: Never Used   Substance and Sexual Activity    Alcohol use: Yes     Alcohol/week: 4.0 standard drinks     Types: 4 Standard drinks or equivalent per week     Comment: 4 a week    Drug use: Never    Sexual activity: Not on file   Other Topics Concern    Not on file   Social History Narrative    Not on file     Social Determinants of Health     Financial Resource Strain:     Difficulty of Paying Living Expenses: Not on file   Food Insecurity:     Worried About Running Out of Food in the Last Year: Not on file    Devika of Food in the Last Year: Not on file   Transportation Needs:     Lack of Transportation (Medical): Not on file    Lack of Transportation (Non-Medical):  Not on file   Physical Activity:     Days of Exercise per Week: Not on file    Minutes of Exercise per Session: Not on file   Stress:     Feeling of Stress : Not on file   Social Connections:     Frequency of Communication with Friends and Family: Not on file    Frequency of Social Gatherings with Friends and Family: Not on file    Attends Caodaism Services: Not on file    Active Member of Clubs or Organizations: Not on file    Attends Club or Organization Meetings: Not on file    Marital Status: Not on file   Intimate Partner Violence:     Fear of Current or Ex-Partner: Not on file   [employer] Abused: Not on file    Physically Abused: Not on file    Sexually Abused: Not on file   Housing Stability:     Unable to Pay for Housing in the Last Year: Not on file    Number of Places Lived in the Last Year: Not on file    Unstable Housing in the Last Year: Not on file           Vitals:  Ht 5' 9\" (1.753 m)   Wt 170 lb (77.1 kg)   BMI 25.10 kg/m²    Body mass index is 25.1 kg/m². SUBJECTIVE/OBJECTIVE:  Maurice Enriqueta (: 1970)   New patient presents today with complaint of right foot pain for the past 1 year and seems to be getting worse not better. The pain is described as moderate pain that is sharp comes and goes associated with numbness and tingling sensations. Symptoms unchanged despite rest, elevation taking ibuprofen and Tylenol. Patient has not seen other physicians or providers for this problem. She is not diabetic, non-smoker. She also describes having flatfeet deformity and in the past had been running for exercise and playing basketball. Physical Exam  Pleasant well-nourished female , alert and oriented to person, time and place, no acute distress. Mostly normal gait, normal weightbearing stance. Right lower extremity/ankle: Calf soft nontender, ligaments are grossly stable, range of motion intact for dorsiflexion/plantarflexion, inversion and eversion. Strength is 5/5 in all directions of motion. Achilles tendon intact with negative Michael test, negative ankle squeeze test.    Right foot: Hypermobile foot with pes planovalgus position and mild to moderate hallux valgus. There is significant flexibility to her foot entirely from midfoot to forefoot, there is minimal discomfort with range of motion of first MTP joint, diffuse tenderness at the midfoot joints. No significant hammertoe deformities. Range of motion intact, flexion/extension strength toes 5/5    Contralateral foot and ankle exam, nontender, no swelling ligaments grossly stable.   Normal weightbearing stance. Neurovascular exam intact for light touch sensation, cap refill, dorsalis pedis pulse palpable, flexion/extension strength 5/5. Skin intact without open wounds, lesions or ulcers, no skin discolorations, normal warmth to skin. Imaging:    XR Results (most recent):  Results from Appointment encounter on 05/04/22    XR STANDING FOOT RT MIN 3 V    Narrative  Right foot standing AP, lateral and oblique x-rays show mild to moderate hallux valgus position with a congruent joint, there is pes planovalgus foot position with some midfoot arthritis seen on lateral views. No acute fractures or dislocations, satisfactory bone density. An electronic signature was used to authenticate this note.   -- Kishan Moya MD

## 2022-05-04 NOTE — LETTER
5/9/2022    Patient: Shaw Turner   YOB: 1970   Date of Visit: 5/4/2022     Nellie Tena, [address] [facility] [address] [facility] [address]  Via Fax: [phone]    Dear Nellie Tena MD,      Thank you for referring Ms. Shaw Turner to [facility] for evaluation. My notes for this consultation are attached. If you have questions, please do not hesitate to call me. I look forward to following your patient along with you.       Sincerely,    Doug Quesada MD

## 2022-08-03 ENCOUNTER — TELEPHONE (OUTPATIENT)
Dept: CARDIOLOGY CLINIC | Age: 52
End: 2022-08-03

## 2022-08-03 NOTE — TELEPHONE ENCOUNTER
Left message for patient to reschedule 08/09/2022 appointment with Dr Laura Lebron.  Provider will not be in office.     Sharla Fox

## 2022-11-23 ENCOUNTER — OFFICE VISIT (OUTPATIENT)
Dept: CARDIOLOGY CLINIC | Age: 52
End: 2022-11-23
Payer: COMMERCIAL

## 2022-11-23 VITALS
RESPIRATION RATE: 18 BRPM | DIASTOLIC BLOOD PRESSURE: 78 MMHG | OXYGEN SATURATION: 99 % | BODY MASS INDEX: 25.48 KG/M2 | HEART RATE: 63 BPM | SYSTOLIC BLOOD PRESSURE: 110 MMHG | WEIGHT: 172 LBS | HEIGHT: 69 IN

## 2022-11-23 DIAGNOSIS — E78.00 ELEVATED LDL CHOLESTEROL LEVEL: ICD-10-CM

## 2022-11-23 DIAGNOSIS — R07.9 CHEST PAIN, UNSPECIFIED TYPE: Primary | ICD-10-CM

## 2022-11-23 PROCEDURE — 99214 OFFICE O/P EST MOD 30 MIN: CPT | Performed by: INTERNAL MEDICINE

## 2022-11-23 PROCEDURE — 93000 ELECTROCARDIOGRAM COMPLETE: CPT | Performed by: INTERNAL MEDICINE

## 2022-11-23 RX ORDER — ROSUVASTATIN CALCIUM 20 MG/1
20 TABLET, COATED ORAL
Qty: 90 TABLET | Refills: 1 | Status: SHIPPED | OUTPATIENT
Start: 2022-11-23 | End: 2022-11-23

## 2022-11-23 RX ORDER — BISMUTH SUBSALICYLATE 262 MG
1 TABLET,CHEWABLE ORAL DAILY
COMMUNITY

## 2022-11-23 RX ORDER — ROSUVASTATIN CALCIUM 20 MG/1
20 TABLET, COATED ORAL
Qty: 90 TABLET | Refills: 1 | Status: SHIPPED | OUTPATIENT
Start: 2022-11-23

## 2022-11-23 NOTE — PROGRESS NOTES
Reviewed new prescription for Crestor 20 mg nightly with patient. Per Dr. Ortiz Come patient is to take every other night for one month and if tolerated to take every day. Pharmacy confirmed. Informed patient that lipid panel lab work requires her to be fasting, so it is best to go one morning. Per Dr. Renetta Diamond in the next 3-6 months.

## 2022-11-23 NOTE — LETTER
11/23/2022    Patient: Kwasi Friday   YOB: 1970   Date of Visit: 11/23/2022     Tere Olivares MD  [facility] [address]  Via Fax: [phone]    Dear Tere Olivares MD,      Thank you for referring Ms. Villalpando Friday to [facility] for evaluation. My notes for this consultation are attached. If you have questions, please do not hesitate to call me. I look forward to following your patient along with you.       Sincerely,    Magalys Johnson MD

## 2022-11-23 NOTE — PROGRESS NOTES
[facility] [place] [facility]: Suzette Winkler  [phone]          Cardiology Consult/Progress Note      Requesting/referring provider: Dr. Mali Schafer  Reason for Consult: Chest fullness/pain    HPI: Trey Spurling, a 45y.o. year-old who presents for evaluation of chest pain in the precordium and left chest. Noticing mostly at night, not during exertion. Associated with pounding palpitation but no rate related palpitations. She is a perimenopausal female who recently started progesterone only hormonal supplements recently which exacerbation there symptoms. Recent ECG with PCP was mentioned to be unremarkable. She also has history of hypothyroidism. She has been noticing more fullness  in chest with feeling of dyspnea over past few months which is non exertional. Her PCP performed lipid profile which showed elevated LDL. She is anxious about above symptoms. She denies any pedal edema. No syncope is reported. She otherwise is active an bikes about 5 miles 3 times a week. Recently she underwent echocardiogram at my recommendations which showed normal LV function with no other structural abnormality. She also underwent an ECG stress test which however was not completely normal.  Subsequent nuclear SPECT showed very small apical perfusion defect possibly an artifact but no other abnormalities. Investigation  Lipids: Jan 2020: , Total Chol 214mg/dl  ECG: Verbally told to be normal in July 2020  TSH Jan 2020: 6  Normal CMP, Jan 2020  HbA1c: 5.3 Jan 2020    Investigations personally reviewed by me  Echocardiogram August 2020: Normal LV function with strain, mild aortic sclerosis otherwise normal echocardiogram  ECG stress test August 2020: Baseline ECG normal.  Patient went to stage III on a Juan C protocol. At stage III she had about 1.5 to 2 mm of ST depressions in inferior leads. She had no associated chest discomfort or shortness of breath.   However she was wearing a mask during exercise and feels that she could have gone further if she was not wearing a mask. Jeanette Lees Hence it was not truly symptom limited stress test.  Nuclear SPECT: possible apical perfusion defect possible artifact. Normal EF. NO other perfusion defect. Assessment/Plan:  1)Chest pain of uncertain etiology  2)Hypothyroidism  3)Perimenopausal  4)Hyperlipidemia  5)Headaches:     Joy Bean was seen for family history of premature CAD. She was previously having chest discomfort which appears to be more related to late night eating and now has improved. Her echocardiogram was normal.  However her stress test ECG changes were seen in the form of intermediate ST depressions. In the setting of such depressions underlying coronary disease cannot be ruled out. Hence she underwent a imaging stress test which shows possible artifact in apical segment with normal wall function. NO other perfusion defects were noted. Discussed lipid abnormalities and family history of CAD. Willing to initiate statins. We will start rosuvastatin 20 mg initially every other day and then switch to daily if tolerated well. Target LDL would be 70 mg/dL or below. I discussed that now that she is through menopause, the highest risk increase for coronary disease would be in the next 10 to 20 years in her life.      []    High complexity decision making was performed  []    Patient is at high-risk of decompensation with multiple organ involvement  She  has no past medical history on file. Review of system:Patient reports no dyspnea/PND/Orthpnea. She reports no cough/fever/focal neurological deficits/abdominal pain. All other systems negative except as above. No family history on file.  Father was a smoker with stroke in 45s and  of an MI in 76s    Social history: Non smoker,     PMH: Hypothyroidism, Chest pain      Social History     Socioeconomic History    Marital status:    Tobacco Use    Smoking status: Never Smokeless tobacco: Never   Substance and Sexual Activity    Alcohol use: Yes     Alcohol/week: 4.0 standard drinks     Types: 4 Standard drinks or equivalent per week     Comment: 4 a week    Drug use: Never      PE  Vitals:    11/23/22 0916   BP: 110/78   Pulse: 63   Resp: 18   SpO2: 99%   Weight: 172 lb (78 kg)   Height: 5' 9\" (1.753 m)    Body mass index is 25.4 kg/m². General:    Alert, cooperative, no distress. Psychiatric:    Normal Mood and affect    Eye/ENT:      Pupils equal, No asymmetry, Conjunctival pink. Able to hear voice at normal amplitude   Lungs:      Visibly symmetric chest expansion, No palpable tenderness. Clear to auscultation bilaterally. Heart[de-identified]    Regular rate and rhythm, S1, S2 normal, no murmur, click, rub or gallop. No JVD, Normal palpable peripheral pulses. No cyanosis   Abdomen:     Soft, non-tender. Bowel sounds normal. No masses,  No      organomegaly. Extremities:   Extremities normal, atraumatic, no edema. Neurologic:   CN II-XII grossly intact. No gross focal deficits           Recent Labs:  No results found for: CHOL, CHOLX, CHLST, CHOLV, 219249, HDL, HDLP, LDL, LDLC, DLDLP, TGLX, TRIGL, TRIGP, CHHD, CHHDX  No results found for: YORDAN, CREAPOC, ACREA, CREA, REFC3, REFC4  No results found for: BUN, BUNPOC, IBUN, MBUNV, BUNV  No results found for: K, KI, PLK, WBK  No results found for: HBA1C, NPT8ABQO, EYJ1ONQG  No results found for: HGBPOC, HGB, HGBP, HGBEXT, HGBEXT  No results found for: PLT, PLTEXT, PLTEXT    Reviewed:  No past medical history on file. Social History     Tobacco Use   Smoking Status Never   Smokeless Tobacco Never     Social History     Substance and Sexual Activity   Alcohol Use Yes    Alcohol/week: 4.0 standard drinks    Types: 4 Standard drinks or equivalent per week    Comment: 4 a week     No Known Allergies  No family history on file.      Current Outpatient Medications   Medication Sig    multivitamin (ONE A DAY) tablet Take 1 Tablet by mouth daily.    levothyroxine (SYNTHROID) 100 mcg tablet Take  by mouth Daily (before breakfast). No current facility-administered medications for this visit. Navi Dunlap MD11/23/22     ATTENTION:   This medical record was transcribed using an electronic medical records/speech recognition system. Although proofread, it may and can contain electronic, spelling and other errors. Corrections may be executed at a later time. Please feel free to contact us for any clarifications as needed.     [facility] [place]  [address], [address] [place] [address]

## 2023-05-24 ENCOUNTER — OFFICE VISIT (OUTPATIENT)
Age: 53
End: 2023-05-24
Payer: COMMERCIAL

## 2023-05-24 VITALS
HEIGHT: 69 IN | WEIGHT: 168.4 LBS | SYSTOLIC BLOOD PRESSURE: 100 MMHG | HEART RATE: 65 BPM | OXYGEN SATURATION: 98 % | BODY MASS INDEX: 24.94 KG/M2 | DIASTOLIC BLOOD PRESSURE: 80 MMHG

## 2023-05-24 DIAGNOSIS — R94.31 ABNORMAL ECG DURING EXERCISE STRESS TEST: Primary | ICD-10-CM

## 2023-05-24 DIAGNOSIS — E78.01 FAMILIAL HYPERCHOLESTEROLEMIA: ICD-10-CM

## 2023-05-24 DIAGNOSIS — E78.00 PURE HYPERCHOLESTEROLEMIA, UNSPECIFIED: ICD-10-CM

## 2023-05-24 PROCEDURE — 93000 ELECTROCARDIOGRAM COMPLETE: CPT | Performed by: INTERNAL MEDICINE

## 2023-05-24 PROCEDURE — 99214 OFFICE O/P EST MOD 30 MIN: CPT | Performed by: INTERNAL MEDICINE

## 2023-05-24 ASSESSMENT — PATIENT HEALTH QUESTIONNAIRE - PHQ9
1. LITTLE INTEREST OR PLEASURE IN DOING THINGS: 0
2. FEELING DOWN, DEPRESSED OR HOPELESS: 0
SUM OF ALL RESPONSES TO PHQ QUESTIONS 1-9: 0
SUM OF ALL RESPONSES TO PHQ9 QUESTIONS 1 & 2: 0
SUM OF ALL RESPONSES TO PHQ QUESTIONS 1-9: 0

## 2023-05-24 NOTE — PROGRESS NOTES
MD ANGELINA Freedman. [facility], [address] [facility] cardiology   [phone]         Cardiology Consult/Progress Note        Requesting/referring provider: Dr. Spring Javier   Reason for Consult: Chest fullness/pain      Assessment/Plan:   1)Chest pain of uncertain etiology, resolved    2)Hypothyroidism   3)Perimenopausal   4)Familial hypercholesterolemia    5)Headaches:   6) Myalgias even at the lowest dose of Crestor     Natalie Jazzy Carrion reports improvement of chest discomfort since her last visit. She feels it was probably driven by significant stress. She continues to do quite active without any functional limitation. However she has not had history of coronary disease and likely has a mild hypercholesterolemia with most recent lipid profiles in the past 2 years documenting LDLs in the range of 160 to 200 mg/day. She has been previously attempted on high intensity statins given the lowest possible dose causing myalgias. I do strongly feel that she requires reduction of LDL to less than 100 mg/dL but unfortunately cannot be achieved with Zetia. I recommended she started on evolocumab placement. We will repeat LDL tenosuspension of this. HPI: Yany Daniels, a  45y.o. year-old who presents for evaluation of chest pain in the precordium and left chest. Noticing mostly at night, not during exertion. Associated with pounding palpitation but no rate related palpitations. She is a perimenopausal female who recently started progesterone only hormonal supplements recently which exacerbation there symptoms. Recent ECG with PCP was mentioned to be unremarkable. She also has history of hypothyroidism. She otherwise  is active an bikes about 5 miles 3 times a week. Recently she underwent echocardiogram at my recommendations which showed normal LV function with no other structural abnormality.   She also underwent an ECG stress test which however was not completely

## 2023-06-22 ENCOUNTER — CLINICAL DOCUMENTATION (OUTPATIENT)
Age: 53
End: 2023-06-22

## 2023-06-29 ENCOUNTER — TELEPHONE (OUTPATIENT)
Age: 53
End: 2023-06-29

## 2023-06-29 NOTE — TELEPHONE ENCOUNTER
[employer] [facility] dept is calling about the denial for Repatha they need more information.     [phone]   Case # [id number]

## 2023-07-03 NOTE — TELEPHONE ENCOUNTER
Spoke with Trinh King, Last LDL per note provided. -170 and other questions answered. Will talk with Dr. Coy Saucedo about Serbian lipid, juan broom, and genetic confirmation for familial hypercholesterolemia. Trinh King states it will be sent as is with unknown answers and if we have information we can reopen case if needed.

## 2023-10-25 ENCOUNTER — CLINICAL DOCUMENTATION (OUTPATIENT)
Age: 53
End: 2023-10-25

## 2023-10-25 ENCOUNTER — TELEPHONE (OUTPATIENT)
Age: 53
End: 2023-10-25

## 2023-10-25 NOTE — TELEPHONE ENCOUNTER
I still have her denial information at my desk. Just following up on conversation from June on what was being requested to confirm familial hypercholesterolemia and plan.

## 2023-10-27 NOTE — PROGRESS NOTES
[address] [facility] [address]  [phone]  October 27, 2023        Regarding:   Suzanna Veliz  [address] [address]               This is to certify that Suzanna Veliz has been evaluated in the Urgent Care on 10/27/2023 and may return to work on 10/28/2023.     RESTRICTIONS:       REMARKS:         SIGNATURE:___________________________________________,   10/27/2023     Vika Ratliff, NP              High score for a young woman, needs to speak with pcp or with cardiology about future mgmt

## 2023-12-04 ENCOUNTER — TELEPHONE (OUTPATIENT)
Age: 53
End: 2023-12-04

## 2023-12-04 NOTE — TELEPHONE ENCOUNTER
Patient called requesting an appt with  sooner than February would like a callback from Nurse for an appt     Patient# [phone]

## 2023-12-06 NOTE — TELEPHONE ENCOUNTER
Attempted to reach patient by telephone. A message was left for return call.     [employer] message sent.

## 2024-01-04 ENCOUNTER — TELEPHONE (OUTPATIENT)
Age: 54
End: 2024-01-04

## 2024-01-04 NOTE — TELEPHONE ENCOUNTER
Spoke to patient about changing appointment time on 01/10/2024 with DR Meeks from 3:00 to 2:00 due to availabiliy. Patient is aware of time change.     Future Appointments   Date Time Provider Department Center   1/10/2024  2:00 PM Wojciech Meeks MD [facility]   2/28/2024  3:00 PM Wojciech Meeks MD [facility]       [facility]

## 2024-01-10 ENCOUNTER — OFFICE VISIT (OUTPATIENT)
Age: 54
End: 2024-01-10
Payer: COMMERCIAL

## 2024-01-10 VITALS
BODY MASS INDEX: 23.16 KG/M2 | HEIGHT: 69 IN | OXYGEN SATURATION: 99 % | WEIGHT: 156.4 LBS | DIASTOLIC BLOOD PRESSURE: 62 MMHG | SYSTOLIC BLOOD PRESSURE: 100 MMHG | HEART RATE: 73 BPM

## 2024-01-10 DIAGNOSIS — E78.00 PURE HYPERCHOLESTEROLEMIA, UNSPECIFIED: Primary | ICD-10-CM

## 2024-01-10 DIAGNOSIS — I25.10 CORONARY ARTERY DISEASE INVOLVING NATIVE CORONARY ARTERY OF NATIVE HEART WITHOUT ANGINA PECTORIS: ICD-10-CM

## 2024-01-10 PROCEDURE — 99214 OFFICE O/P EST MOD 30 MIN: CPT | Performed by: INTERNAL MEDICINE

## 2024-01-10 RX ORDER — EZETIMIBE 10 MG/1
10 TABLET ORAL DAILY
Qty: 90 TABLET | Refills: 3 | Status: SHIPPED | OUTPATIENT
Start: 2024-01-10

## 2024-01-10 NOTE — PROGRESS NOTES
MD ZACHARY Shetty NP                                      [facility] cardiology   [phone]         Cardiology Consult/Progress Note        Requesting/referring provider: Dr. Harley   Reason for Consult: Chest fullness/pain      Assessment/Plan:   1)Chest pain of uncertain etiology, resolved    2)Hypothyroidism   3)Perimenopausal   4)Familial hypercholesterolemia    5)Headaches:   6) Myalgias even at the lowest dose of Crestor    7) Presumed coronary artery disease with nuclear stress test demonstrating apical perfusion defect    Floresita Carrion reports improvement of chest discomfort since her last visit.  She feels it was probably driven by significant stress.  She continues to do quite active without any functional limitation.  However she has not had history of coronary disease and likely has a significant hypercholesterolemia with most recent lipid profiles in the past 2 years documenting LDLs in the range of 160 to 200 mg/day.  She has been previously attempted on high intensity statins given the lowest possible dose causing myalgias.  I do strongly feel that she requires reduction of LDL to less than 100 mg/dL but unfortunately cannot be achieved with Zetia.  Will plan to initiate her on inclisiran. Repatha was previously denied by insurance.     HPI: Floresita Carrion, a  52 y.o. year-old who presents for evaluation of chest pain in the precordium and left chest. Noticing mostly at night, not during exertion. Associated with pounding palpitation but no rate related palpitations.  She is a perimenopausal female who recently started progesterone only hormonal supplements recently which exacerbation there symptoms. Recent ECG with PCP was mentioned to be unremarkable.  She also has history of hypothyroidism. She otherwise  is active an bikes about 5 miles 3 times a week.  Recently she underwent echocardiogram at my recommendations which showed normal LV function with no other

## 2024-06-05 ENCOUNTER — OFFICE VISIT (OUTPATIENT)
Age: 54
End: 2024-06-05
Payer: COMMERCIAL

## 2024-06-05 VITALS
BODY MASS INDEX: 23.25 KG/M2 | HEART RATE: 63 BPM | DIASTOLIC BLOOD PRESSURE: 64 MMHG | WEIGHT: 157 LBS | HEIGHT: 69 IN | OXYGEN SATURATION: 98 % | SYSTOLIC BLOOD PRESSURE: 98 MMHG

## 2024-06-05 DIAGNOSIS — I25.10 CORONARY ARTERY DISEASE INVOLVING NATIVE CORONARY ARTERY OF NATIVE HEART WITHOUT ANGINA PECTORIS: Primary | ICD-10-CM

## 2024-06-05 DIAGNOSIS — R07.9 CHEST PAIN, UNSPECIFIED TYPE: ICD-10-CM

## 2024-06-05 PROCEDURE — 93000 ELECTROCARDIOGRAM COMPLETE: CPT | Performed by: INTERNAL MEDICINE

## 2024-06-05 PROCEDURE — 99214 OFFICE O/P EST MOD 30 MIN: CPT | Performed by: INTERNAL MEDICINE

## 2024-06-05 NOTE — PROGRESS NOTES
inferior leads.  She had no associated chest discomfort or shortness of breath.  However she  was wearing a mask during exercise and feels that she could have gone further if she was not wearing a mask..  Hence it was not truly symptom limited stress test.   Nuclear SPECT: possible apical perfusion. Normal EF. NO other perfusion defect.           []    High complexity decision making was performed   []    Patient is at high-risk of decompensation  with multiple organ involvement   She  has no past medical history on file.   Review of system:Patient reports no dyspnea/PND/Orthpnea. She reports no cough/fever/focal neurological deficits/abdominal pain.All other systems negative except as above.        No family history on file. Father was a smoker with stroke in 40s and  of an MI in 70s      Social history: Non smoker,       PMH: Hypothyroidism, Chest pain                General:     Alert, cooperative, no distress.     Psychiatric:     Normal Mood and affect      Eye/ENT:       Pupils equal, No asymmetry, Conjunctival pink. Able to hear voice at normal amplitude     Lungs:       Visibly symmetric chest expansion, No palpable tenderness. Clear to auscultation bilaterally.      Heart::     Regular rate and rhythm, S1, S2 normal, no murmur, click, rub or gallop.No JVD, Normal palpable peripheral pulses. No cyanosis     Abdomen:      Soft, non-tender. Bowel sounds normal. No masses,  No       organomegaly.     Extremities:    Extremities normal, atraumatic, no edema.        Neurologic:       Patient Active Problem List   Diagnosis    Anxiety    Acquired hypothyroidism    Abnormal ECG during exercise stress test    Flushing, menopausal    Chest pain       No Known Allergies    Current Outpatient Medications on File Prior to Visit   Medication Sig Dispense Refill    levothyroxine (SYNTHROID) 100 MCG tablet Take 0.5 tablets by mouth every morning (before breakfast) 50 mcg      ezetimibe (ZETIA) 10 MG tablet Take

## 2025-03-17 ENCOUNTER — HOSPITAL ENCOUNTER (OUTPATIENT)
Facility: HOSPITAL | Age: 55
Discharge: HOME | End: 2025-03-20

## 2025-03-17 DIAGNOSIS — Z13.6 SCREENING FOR CARDIOVASCULAR CONDITION: ICD-10-CM

## 2025-03-17 PROCEDURE — 75571 CT HRT W/O DYE W/CA TEST: CPT

## 2025-03-27 ENCOUNTER — TELEPHONE (OUTPATIENT)
Age: 55
End: 2025-03-27

## 2025-03-27 NOTE — TELEPHONE ENCOUNTER
Received fax from PCP with coronary calcium score results:  LM: 0  LAD: 22  LCx: 0  RCA: 0  PDA: 0

## 2025-03-31 ENCOUNTER — TELEPHONE (OUTPATIENT)
Age: 55
End: 2025-03-31

## 2025-03-31 DIAGNOSIS — I25.10 CORONARY ARTERY DISEASE INVOLVING NATIVE CORONARY ARTERY OF NATIVE HEART WITHOUT ANGINA PECTORIS: Primary | ICD-10-CM

## 2025-03-31 DIAGNOSIS — E78.00 PURE HYPERCHOLESTEROLEMIA, UNSPECIFIED: ICD-10-CM

## 2025-03-31 RX ORDER — EZETIMIBE 10 MG/1
10 TABLET ORAL DAILY
Qty: 30 TABLET | Refills: 0 | Status: SHIPPED | OUTPATIENT
Start: 2025-03-31

## 2025-03-31 NOTE — TELEPHONE ENCOUNTER
Patient is calling because she would like to discuss the medication that the doctor and were talking about trying.Patient wants to proceed with this.    [phone]

## 2025-03-31 NOTE — TELEPHONE ENCOUNTER
Telephone call made to patient. Two patient identifiers verified. Pt started on Zetia. Will discuss other options with Dr. Meeks. Pt verbalizes understanding and is appreciative  Requested Prescriptions     Signed Prescriptions Disp Refills    ezetimibe (ZETIA) 10 MG tablet 30 tablet 0     Sig: Take 1 tablet by mouth daily     Authorizing Provider: CLARIBEL MEEKS     Ordering User: HERACLIO BOWERS       Future Appointments   Date Time Provider Department Center   6/10/2025 11:00 AM Abby Partida ACNP [facility]

## 2025-04-02 ENCOUNTER — PATIENT MESSAGE (OUTPATIENT)
Age: 55
End: 2025-04-02

## 2025-04-11 PROBLEM — E78.00 HYPERCHOLESTEROLEMIA: Status: ACTIVE | Noted: 2025-04-11

## 2025-04-23 ENCOUNTER — TELEPHONE (OUTPATIENT)
Age: 55
End: 2025-04-23

## 2025-04-23 NOTE — TELEPHONE ENCOUNTER
Patient is calling because she would like to speak with the nurse about her Repatha Sure Click injection.    Patient said that the coupon she had was rejected and the pharmacy said the id code wasn't matching she is trying to see if she can get any other assistance.    Patient said she sent you an email also in reference to the prescription.    [phone]

## 2025-04-30 DIAGNOSIS — I25.10 CORONARY ARTERY DISEASE INVOLVING NATIVE CORONARY ARTERY OF NATIVE HEART WITHOUT ANGINA PECTORIS: ICD-10-CM

## 2025-04-30 DIAGNOSIS — E78.00 PURE HYPERCHOLESTEROLEMIA, UNSPECIFIED: ICD-10-CM

## 2025-05-06 RX ORDER — EZETIMIBE 10 MG/1
10 TABLET ORAL DAILY
Qty: 90 TABLET | Refills: 1 | Status: SHIPPED | OUTPATIENT
Start: 2025-05-06

## 2025-05-06 NOTE — TELEPHONE ENCOUNTER
Requested Prescriptions     Signed Prescriptions Disp Refills    ezetimibe (ZETIA) 10 MG tablet 90 tablet 1     Sig: TAKE 1 TABLET BY MOUTH DAILY     Authorizing Provider: CLARIBEL CADENA     Ordering User: HENNA NGUYEN per MD    Future Appointments   Date Time Provider Department Center   6/10/2025 11:00 AM Abby Partida ACNP [facility]

## 2025-08-06 ENCOUNTER — OFFICE VISIT (OUTPATIENT)
Age: 55
End: 2025-08-06
Payer: COMMERCIAL

## 2025-08-06 VITALS
SYSTOLIC BLOOD PRESSURE: 100 MMHG | HEART RATE: 64 BPM | HEIGHT: 69 IN | OXYGEN SATURATION: 98 % | DIASTOLIC BLOOD PRESSURE: 60 MMHG | BODY MASS INDEX: 23.25 KG/M2 | WEIGHT: 157 LBS | RESPIRATION RATE: 16 BRPM

## 2025-08-06 DIAGNOSIS — R07.9 CHEST PAIN, UNSPECIFIED TYPE: ICD-10-CM

## 2025-08-06 DIAGNOSIS — E03.9 ACQUIRED HYPOTHYROIDISM: ICD-10-CM

## 2025-08-06 DIAGNOSIS — I25.10 CORONARY ARTERY DISEASE INVOLVING NATIVE CORONARY ARTERY OF NATIVE HEART WITHOUT ANGINA PECTORIS: ICD-10-CM

## 2025-08-06 DIAGNOSIS — E78.00 HYPERCHOLESTEROLEMIA: Primary | ICD-10-CM

## 2025-08-06 PROCEDURE — 99214 OFFICE O/P EST MOD 30 MIN: CPT | Performed by: NURSE PRACTITIONER

## 2025-08-06 PROCEDURE — 93000 ELECTROCARDIOGRAM COMPLETE: CPT | Performed by: NURSE PRACTITIONER

## 2025-08-06 RX ORDER — ESTRADIOL 0.1 MG/G
1 CREAM VAGINAL
COMMUNITY
Start: 2025-06-19 | End: 2026-06-19

## 2025-08-06 ASSESSMENT — PATIENT HEALTH QUESTIONNAIRE - PHQ9
SUM OF ALL RESPONSES TO PHQ QUESTIONS 1-9: 0
1. LITTLE INTEREST OR PLEASURE IN DOING THINGS: NOT AT ALL
SUM OF ALL RESPONSES TO PHQ QUESTIONS 1-9: 0
2. FEELING DOWN, DEPRESSED OR HOPELESS: NOT AT ALL